# Patient Record
Sex: MALE | URBAN - METROPOLITAN AREA
[De-identification: names, ages, dates, MRNs, and addresses within clinical notes are randomized per-mention and may not be internally consistent; named-entity substitution may affect disease eponyms.]

---

## 2017-07-12 ENCOUNTER — EMERGENCY (EMERGENCY)
Facility: HOSPITAL | Age: 27
LOS: 1 days | Discharge: HOME | End: 2017-07-12
Admitting: EMERGENCY MEDICINE
Payer: MEDICAID

## 2017-07-12 VITALS
OXYGEN SATURATION: 99 % | TEMPERATURE: 98 F | HEART RATE: 92 BPM | DIASTOLIC BLOOD PRESSURE: 88 MMHG | RESPIRATION RATE: 16 BRPM | SYSTOLIC BLOOD PRESSURE: 141 MMHG

## 2017-07-12 PROCEDURE — 99284 EMERGENCY DEPT VISIT MOD MDM: CPT

## 2017-07-12 RX ORDER — IBUPROFEN 200 MG
600 TABLET ORAL ONCE
Qty: 0 | Refills: 0 | Status: COMPLETED | OUTPATIENT
Start: 2017-07-12 | End: 2017-07-12

## 2017-07-12 RX ADMIN — Medication 600 MILLIGRAM(S): at 22:15

## 2017-07-12 NOTE — CONSULT NOTE ADULT - SUBJECTIVE AND OBJECTIVE BOX
Patient is a 27y old male who presents with a chief complaint of a tongue laceration    HPI: Three hours ago pt tripped and fell, lacerating his left lateral tongue with his teeth.       PAST MEDICAL & SURGICAL HISTORY: non-contributory    MEDICATIONS  (STANDING): none    MEDICATIONS  (PRN): none      Allergies: NKDA      *SOCIAL HISTORY: pt admits to smoking occasionally     Vital Signs Last 24 Hrs  T(C): 36.7 (12 Jul 2017 21:32), Max: 36.7 (12 Jul 2017 21:32)  T(F): 98 (12 Jul 2017 21:32), Max: 98 (12 Jul 2017 21:32)  HR: 92 (12 Jul 2017 21:32) (92 - 92)  BP: 141/88 (12 Jul 2017 21:32) (141/88 - 141/88)  BP(mean): --  RR: 16 (12 Jul 2017 21:32) (16 - 16)  SpO2: 99% (12 Jul 2017 21:32) (99% - 99%)    EOE:  TMJ ( - )  clicks                    ( -  ) pops                    (  - ) crepitus             ( -  ) trismus             ( -  ) LAD             ( -  ) swelling             ( -  ) asymmetry             ( -  ) palpation              IOE:  permanent dentition: grossly intact            hard/soft palate:  ( - ) palatal torus           tongue/FOM: 8 mm laceration on left lateral border of tongue           labial/buccal mucosa: WNL    *DENTAL RADIOGRAPHS: not indicated, no foreign body introduced into oral cavity    ASSESSMENT: 8 mm tongue laceration on left lateral border. Edges well-approximated, hemostatic. Sutures not indicated at this time.     RECOMMENDATIONS:  1) Soft diet, mouth rinse with warm saltwater or alcohol free mouthwash.   2) OTC pain meds PRN.  2) Dental F/U with outpatient dentist for comprehensive dental care.   3) If any difficulty swallowing/breathing, fever, or swelling occur, return to ED.    Jacqui Singh DDS #[id number]  LEAH Gu DDS

## 2017-07-12 NOTE — ED PROVIDER NOTE - OBJECTIVE STATEMENT
26 yo male with no sig PMHx presents to the ED. 26 yo male with no sig PMHx presents to the ED c/o tongue laceration after he had bit it by accident around 8pm (3 hours ago).  Pt states he had fallen backwards, when he was catching his fall bit his tongue. Pain is 6/10. Denies head trauma, LOC, massive bleeding, injury to any other body part, fever, chills, nausea, vomiting. Last tdap was 2 years ago.

## 2017-07-12 NOTE — ED PROVIDER NOTE - PLAN OF CARE
Follow up with your Primary Medical Doctor within 2-3days. If results or reports were given to you, show copies of your reports given to you. Take Motrin 600mg every 8hrs with food for pain. Take all of your medications as previously prescribed.

## 2017-07-12 NOTE — ED ADULT TRIAGE NOTE - CHIEF COMPLAINT QUOTE
Pt st" I tripped and fell around 8pm and bit my tongue in process when I hit the ground. Now I have a cut on tongue that is deep...bothers me when I eat or drink otherwise I feel ok." + left sided tongue laceration. No bleeding noted at this time.  Denies head trauma, denies med hx.

## 2017-07-12 NOTE — ED PROVIDER NOTE - CARE PLAN
Principal Discharge DX:	Tongue laceration  Instructions for follow-up, activity and diet:	Follow up with your Primary Medical Doctor within 2-3days. If results or reports were given to you, show copies of your reports given to you. Take Motrin 600mg every 8hrs with food for pain. Take all of your medications as previously prescribed.

## 2017-07-12 NOTE — ED ADULT NURSE NOTE - OBJECTIVE STATEMENT
Patient received in room #10B c/o lac to left side of tongue. A&OX3, ambulatory. Patient reports he fell and bit his tongue. Patient c/o bleeding earlier, no active bleeding currently. Patient denies any difficulty breathing or difficulty swallowing. Patient appears in nad, respirations even and unlabored. Will monitor.

## 2017-07-12 NOTE — ED PROVIDER NOTE - PHYSICAL EXAMINATION
+ .5 cm tongue laceration on left lateral tongue. Well approximated, not through and through, not deep, no flap.
